# Patient Record
Sex: FEMALE | Race: WHITE | NOT HISPANIC OR LATINO | Employment: FULL TIME | URBAN - METROPOLITAN AREA
[De-identification: names, ages, dates, MRNs, and addresses within clinical notes are randomized per-mention and may not be internally consistent; named-entity substitution may affect disease eponyms.]

---

## 2017-01-09 ENCOUNTER — GENERIC CONVERSION - ENCOUNTER (OUTPATIENT)
Dept: OTHER | Facility: OTHER | Age: 37
End: 2017-01-09

## 2017-01-26 ENCOUNTER — GENERIC CONVERSION - ENCOUNTER (OUTPATIENT)
Dept: OTHER | Facility: OTHER | Age: 37
End: 2017-01-26

## 2017-02-16 ENCOUNTER — GENERIC CONVERSION - ENCOUNTER (OUTPATIENT)
Dept: OTHER | Facility: OTHER | Age: 37
End: 2017-02-16

## 2017-04-07 ENCOUNTER — ALLSCRIPTS OFFICE VISIT (OUTPATIENT)
Dept: OTHER | Facility: OTHER | Age: 37
End: 2017-04-07

## 2017-04-11 ENCOUNTER — GENERIC CONVERSION - ENCOUNTER (OUTPATIENT)
Dept: OTHER | Facility: OTHER | Age: 37
End: 2017-04-11

## 2017-04-28 ENCOUNTER — GENERIC CONVERSION - ENCOUNTER (OUTPATIENT)
Dept: OTHER | Facility: OTHER | Age: 37
End: 2017-04-28

## 2017-05-02 ENCOUNTER — GENERIC CONVERSION - ENCOUNTER (OUTPATIENT)
Dept: OTHER | Facility: OTHER | Age: 37
End: 2017-05-02

## 2017-06-05 ENCOUNTER — GENERIC CONVERSION - ENCOUNTER (OUTPATIENT)
Dept: OTHER | Facility: OTHER | Age: 37
End: 2017-06-05

## 2017-06-08 ENCOUNTER — GENERIC CONVERSION - ENCOUNTER (OUTPATIENT)
Dept: OTHER | Facility: OTHER | Age: 37
End: 2017-06-08

## 2017-06-19 ENCOUNTER — GENERIC CONVERSION - ENCOUNTER (OUTPATIENT)
Dept: OTHER | Facility: OTHER | Age: 37
End: 2017-06-19

## 2017-06-20 ENCOUNTER — GENERIC CONVERSION - ENCOUNTER (OUTPATIENT)
Dept: OTHER | Facility: OTHER | Age: 37
End: 2017-06-20

## 2017-07-21 ENCOUNTER — GENERIC CONVERSION - ENCOUNTER (OUTPATIENT)
Dept: OTHER | Facility: OTHER | Age: 37
End: 2017-07-21

## 2017-11-20 ENCOUNTER — GENERIC CONVERSION - ENCOUNTER (OUTPATIENT)
Dept: OTHER | Facility: OTHER | Age: 37
End: 2017-11-20

## 2018-01-12 VITALS
HEART RATE: 70 BPM | RESPIRATION RATE: 16 BRPM | HEIGHT: 63 IN | WEIGHT: 127.38 LBS | SYSTOLIC BLOOD PRESSURE: 110 MMHG | DIASTOLIC BLOOD PRESSURE: 84 MMHG | TEMPERATURE: 98.5 F | BODY MASS INDEX: 22.57 KG/M2 | OXYGEN SATURATION: 99 %

## 2018-06-04 ENCOUNTER — TRANSCRIBE ORDERS (OUTPATIENT)
Dept: ADMINISTRATIVE | Facility: HOSPITAL | Age: 38
End: 2018-06-04

## 2018-06-04 DIAGNOSIS — K59.00 CONSTIPATION, UNSPECIFIED CONSTIPATION TYPE: Primary | ICD-10-CM

## 2018-06-07 ENCOUNTER — HOSPITAL ENCOUNTER (OUTPATIENT)
Dept: RADIOLOGY | Facility: HOSPITAL | Age: 38
Discharge: HOME | End: 2018-06-07
Payer: COMMERCIAL

## 2018-06-07 DIAGNOSIS — K59.00 CONSTIPATION, UNSPECIFIED CONSTIPATION TYPE: ICD-10-CM

## 2018-06-07 PROCEDURE — 74280 X-RAY XM COLON 2CNTRST STD: CPT

## 2018-06-07 RX ADMIN — IOHEXOL 100 ML: 350 INJECTION, SOLUTION INTRAVENOUS at 07:14

## 2018-06-07 RX ADMIN — IOTHALAMATE MEGLUMINE 250 ML: 172 INJECTION URETERAL at 09:04

## 2019-03-19 ENCOUNTER — TELEPHONE (OUTPATIENT)
Dept: FAMILY MEDICINE CLINIC | Facility: CLINIC | Age: 39
End: 2019-03-19

## 2019-03-19 DIAGNOSIS — Z20.828 EXPOSURE TO THE FLU: Primary | ICD-10-CM

## 2019-03-19 RX ORDER — OSELTAMIVIR PHOSPHATE 75 MG/1
75 CAPSULE ORAL DAILY
Qty: 10 CAPSULE | Refills: 0 | Status: SHIPPED | OUTPATIENT
Start: 2019-03-19 | End: 2019-03-29

## 2019-03-19 NOTE — PROGRESS NOTES
Patient's daughter has the flu a and she was told to call her PCP for Tamiflu for prophylaxis  She is to have a study later this week regarding her colon and does not want to come down with the flu so that she can have her study done  I sent a prescription for Tamiflu to [facility] for her

## 2019-03-19 NOTE — TELEPHONE ENCOUNTER
Kevinflorence Kulkarni called the office her daughter tested positive for the flu  Pt was instructed if she begins to have symptoms to call her pcp  Pt has no fever but feels  achy weak/ cold like having chills  Jimmy Scott was asking if you could send Lissette i flu into the [facility] in [place]  Pt was last seen on 4/17/2017  Ryanne's number is [phone] I informed pt I would send you a message and once we have an answer we would call her back

## 2019-12-27 ENCOUNTER — TELEPHONE (OUTPATIENT)
Dept: FAMILY MEDICINE CLINIC | Facility: CLINIC | Age: 39
End: 2019-12-27

## 2019-12-27 NOTE — TELEPHONE ENCOUNTER
[id number] Brandan Soler stating that Pt states that during surgery doctor cut an artery and she had to receive blood transfusions and was put on Gabapentin for nerve pain  Pt is still complaining of pain down her R leg and was advised by home care nurse to make a follow up appt with you  Sirisha states she has no knowledge of Pt having any complications in her report from [facility] but Pt seems well versed on what happened during surgery   Sirisha noted there was no redness, tenderness, and pulses were normal upon exam

## 2019-12-28 NOTE — TELEPHONE ENCOUNTER
Sandra, I am not sure that we are patient's PCP  You may want to give [facility] home care a call about this  It looks like she has an appointment with Anjali MATAMOROS on the 30th

## 2021-04-06 DIAGNOSIS — Z23 ENCOUNTER FOR IMMUNIZATION: ICD-10-CM

## 2021-05-10 PROBLEM — K63.1 BOWEL PERFORATION (HCC): Status: ACTIVE | Noted: 2017-02-03

## 2021-05-10 PROBLEM — R14.0 BLOATING: Status: ACTIVE | Noted: 2021-05-10
